# Patient Record
Sex: FEMALE | Race: WHITE | NOT HISPANIC OR LATINO | URBAN - METROPOLITAN AREA
[De-identification: names, ages, dates, MRNs, and addresses within clinical notes are randomized per-mention and may not be internally consistent; named-entity substitution may affect disease eponyms.]

---

## 2023-11-07 PROBLEM — G23.1 PSP (PROGRESSIVE SUPRANUCLEAR PALSY) (MULTI): Status: ACTIVE | Noted: 2023-11-07

## 2023-11-07 RX ORDER — METOPROLOL SUCCINATE 100 MG/1
TABLET, EXTENDED RELEASE ORAL
COMMUNITY
Start: 2021-06-04

## 2023-11-07 RX ORDER — GABAPENTIN 100 MG/1
1 CAPSULE ORAL NIGHTLY
COMMUNITY

## 2023-11-07 RX ORDER — CARBIDOPA AND LEVODOPA 25; 100 MG/1; MG/1
3 TABLET ORAL 2 TIMES DAILY
COMMUNITY
Start: 2022-01-26 | End: 2024-04-25

## 2023-11-07 RX ORDER — VALSARTAN AND HYDROCHLOROTHIAZIDE 320; 25 MG/1; MG/1
TABLET, FILM COATED ORAL
COMMUNITY
Start: 2022-03-08

## 2023-11-07 RX ORDER — SERTRALINE HYDROCHLORIDE 100 MG/1
1.5 TABLET, FILM COATED ORAL DAILY
COMMUNITY
Start: 2022-01-26

## 2023-11-08 ENCOUNTER — OFFICE VISIT (OUTPATIENT)
Dept: NEUROLOGY | Facility: CLINIC | Age: 63
End: 2023-11-08
Payer: COMMERCIAL

## 2023-11-08 VITALS — HEART RATE: 74 BPM | SYSTOLIC BLOOD PRESSURE: 120 MMHG | RESPIRATION RATE: 14 BRPM | DIASTOLIC BLOOD PRESSURE: 72 MMHG

## 2023-11-08 DIAGNOSIS — G23.1 PSP (PROGRESSIVE SUPRANUCLEAR PALSY) (MULTI): Primary | ICD-10-CM

## 2023-11-08 PROCEDURE — 99214 OFFICE O/P EST MOD 30 MIN: CPT | Performed by: PSYCHIATRY & NEUROLOGY

## 2023-11-08 PROCEDURE — 1036F TOBACCO NON-USER: CPT | Performed by: PSYCHIATRY & NEUROLOGY

## 2023-11-08 RX ORDER — AMANTADINE HYDROCHLORIDE 100 MG/1
100 TABLET ORAL DAILY
Qty: 60 TABLET | Refills: 3 | Status: SHIPPED | OUTPATIENT
Start: 2023-11-08 | End: 2023-11-24 | Stop reason: SDUPTHER

## 2023-11-08 ASSESSMENT — UNIFIED PARKINSONS DISEASE RATING SCALE (UPDRS)
SPEECH: 4
LEVODOPA: YES
FREEZING_GAIT: 0
TOETAPPING_RIGHT: 3
GAIT: 0
AMPLITUDE_RLE: 0
POSTURE: 2
AMPLITUDE_LUE: 0
RIGIDITY_RUE: 1
LEG_AGILITY_LEFT: 3
CONSTANCY_TREMOR_ATREST: 0
PRONATION_SUPINATION_RIGHT: 2
TOETAPPING_LEFT: 3
LEG_AGILITY_RIGHT: 3
CLINICAL_STATE: OFF
RIGIDITY_LLE: 0
POSTURAL_TREMOR_LEFTHAND: 0
FINGER_TAPPING_LEFT: 3
KINETIC_TREMOR_LEFTHAND: 0
POSTURAL_TREMOR_RIGHTHAND: 0
AMPLITUDE_RUE: 0
AMPLITUDE_LIP_JAW: 0
FINGER_TAPPING_RIGHT: 2
SPONTANEITY_OF_MOVEMENT: 2
AMPLITUDE_LLE: 0
POSTURAL_STABILITY: 0
RIGIDITY_NECK: 2
PARKINSONS_MEDS: YES
TOTAL_SCORE: 43
PRONATION_SUPINATION_LEFT: 3
HANDMOVEMENTS_RIGHT: 2
RIGIDITY_LUE: 2
CHAIR_RISING_SCALE: 0
FACIAL_EXPRESSION: 3
RIGIDITY_RLE: 0
KINETIC_TREMOR_RIGHTHAND: 0

## 2023-11-08 ASSESSMENT — PATIENT HEALTH QUESTIONNAIRE - PHQ9
SUM OF ALL RESPONSES TO PHQ9 QUESTIONS 1 AND 2: 0
2. FEELING DOWN, DEPRESSED OR HOPELESS: NOT AT ALL
1. LITTLE INTEREST OR PLEASURE IN DOING THINGS: NOT AT ALL

## 2023-11-08 ASSESSMENT — ENCOUNTER SYMPTOMS
DEPRESSION: 0
OCCASIONAL FEELINGS OF UNSTEADINESS: 0
LOSS OF SENSATION IN FEET: 0

## 2023-11-08 NOTE — PROGRESS NOTES
History Of Present Illness    Ms. Powell is a 63 year old female presenting for follow-up for PSP.  Her neurologic exam has showed slowing of vertical>horizontal eye movements, early falls, progressive parkinsonism poorly levodopa responsive, and positive applause sign with grasping behaviors.  She has been tried on levodopa up to 1000 mg daily without improvement in symptoms.  She experienced fatigue and GI symptoms at this dose.     Interval events  Last seen in March 2023.  Major issue at that time was falls related to rollator sliding out.  She discussed weaning off Sinemet completely however is still taking 3 tabs twice a day.    She is taking 3 tabs BID, cannot tell if they are helping (taking it at 10am, 7pm)  She is having headaches daily   Some swallowing issues per , she choked on pills once, she does not have any issues for solids or liquids   Her  has installed [id number] rails in their house that she holds onto to ambulate around the house  She is not using the walker as much   PT helped some but she was discharged from PT due to insurance issues   Sleep in fine , goes to bed 7-8pm, needs help in and out of bed.   Since taking gabapentin - pain in legs is better  She had a fall a few months ago and fell on her left arm, went to ED and XRs were negative for fracture. She is taking ibuprofen for pain.     Meds:  carbidopa-levodopa 3 tabs 2 times a day--denies benefit. Higher doses 5x/day were not helpful, felt there was zero benefit.   SE: denies       Review of Systems:  14 point review of systems conducted and negative other than noted in HPI.    Past Medical History  No past medical history on file.  Surgical History  No past surgical history on file.  Medications  (Not in a hospital admission)      Current Outpatient Medications:     carbidopa-levodopa (Sinemet)  mg tablet, Take 3 tablets by mouth 4 times a day., Disp: , Rfl:     gabapentin (Neurontin) 100 mg capsule, Take 1  tablet by mouth once daily at bedtime., Disp: , Rfl:     metoprolol succinate XL (Toprol-XL) 100 mg 24 hr tablet, Metoprolol Succinate  MG Oral Tablet Extended Release 24 Hour  Quantity: 30  Refills: 0      Start : 4-Jun-2021  Active, Disp: , Rfl:     sertraline (Zoloft) 100 mg tablet, Take 1.5 tablets (150 mg) by mouth once daily., Disp: , Rfl:     valsartan-hydrochlorothiazide (Diovan-HCT) 320-25 mg tablet, Valsartan-hydroCHLOROthiazide 320-25 MG Oral Tablet  Quantity: 30  Refills: 0      Start : 8-Mar-2022  Active, Disp: , Rfl:      Social History  Social History     Tobacco Use    Smoking status: Never    Smokeless tobacco: Never     Allergies  Mold, Other, and Pollen extracts    No MRI head results found for the past 12 months  No CT head results found for the past 12 months    Last Recorded Vitals  Blood pressure 120/72, pulse 74, resp. rate 14.    NEUROLOGIC EXAM:    MENTAL STATUS:  - Alert and oriented to self, year, situation and month.  - Recall of recent events intact. Fund of knowledge intact.  - Speech is is dysarthric    CRANIAL NERVES:  - CN I: Not Assessed  - CN II: Pupils constrict to light bilaterally 3->2 mm, visual fields intact bilaterally  - CN III, IV, VI: Severely restricted vertical gaze.  Impaired horizontal saccadic movements  - CN V: Facial sensation intact to light touch  - CN VII: No facial droop, closes eyes against resistance  - CN VIII: Hearing intact to voice  - CN IX, X: Palate elevates symmetrically  - CN XII: Tongue midline without atrophy or fasciculations    MOTOR:  - Strength: R                L  Shoulder Abd 5                5  Elbow Flex 5                5  Elbow Ext  5                5  Left hand dystonia    Hip flexion 5 -               5-  Knee Ext  5                5  Knee Flex        5                 5  DorsiFlex 5                5  PlantarFlex       5                5      COORDINATION: Finger-to-nose slow bilaterally  SENSATION: Intact and symmetric to light  touch in all extremities    MDS UPDRS 1st Score: Motor Examination  Is the patient on medication for treating the symptoms of Parkinson's Disease?: Yes  Patients receiving medication for treating the symptoms of Parkinson's Disease, delmis the patient's clinical state.: Off  Is the patient on Levodopa?: Yes  Speech: 4  Facial Expression: 3  Rigidty Neck: 2  Rigidty RUE: 1  Rigidity - LUE: 2  Rigidity RLE: 0  Rigidity LLE: 0  Finger Tapping Right Hand: 2  Finger Tapping Left Hand: 3  Hand Movements- Right Hand: 2  Hand Movements- Left Hand: 3  Pronatiaon-Supination Movments - Right Hand: 2  Pronatiaon-Supination Movments Left Hand: 3  Toe Tapping Right Foot: 3  Toe Tapping - Left Foot: 3  Leg Agility - Right Leg: 3  Leg Agility - Left leg: 3  Arising from Chair: 0 (unable to test)  Gait: 0 (unable to test)  Freezing of Gait: 0 (unable to test)  Postural Stability: 0 (unable to test)  Posture: 2  Global Spontanteity of Movment ( Body Bradykinesia): 2  Postural Tremor - Right Hand: 0  Postural Tremor - Left hand: 0  Kinetic Tremor - Right hand: 0  Kinetic Tremor - Left hand: 0  Rest Tremor Amplitude - RUE: 0  Rest Tremor Amplitude - LUE: 0  Rest Tremor Amplitude - RLE: 0  Rest Tremor Amplitude - LLE: 0  Rest Tremor Amplitude - Lip/Jaw: 0  Constancy of Rest Tremor: 0  MDS UPDRS Total Score: 43        Relevant Results  No results found for this or any previous visit (from the past 24 hour(s)).           Assessment    Ms. Powell is a 63 year old female presenting for follow-up for PSP.  Her neurologic exam has showed slowing of vertical>horizontal eye movements, early falls, progressive parkinsonism poorly levodopa responsive, and positive applause sign with grasping behaviors.  She has been tried on levodopa up to 1000 mg daily without improvement in symptoms.  She experienced fatigue and GI symptoms at this dose.  We discussed retrialing higher dose of Sinemet however she experienced side effects on the 1000 mg daily  without any benefit.  Anecdotally amantadine has been used with some improvement in gait and patient with PSP which patient and family would like to try.    Impression: Progressive supranuclear palsy    Recommendations  - Continue current Sinemet dosing 3 tabs twice daily (patient has hard time taking medications 3 times daily)  - Start amantadine 100 mg daily, after 1 week if tolerating increase to twice a day  -We will attempt for you stop approval  -Referral for home health physical therapy  -Return in 5 months, okay for virtual visit if preferred by patient    Mejia Rosas MD PhD Neurology Resident     Patient seen and discussed with Dr Jacob

## 2023-11-08 NOTE — PATIENT INSTRUCTIONS
Thank you for coming to see us today!    - take sinemet 3 tabs two times a day   - we will start a medication called amantadine , take 100mg daily for one week then increase to 100mg twice a day   - let us know if you have any side effects with the medication   - we will refer you for home physical therapy   - we will try to work on getting the U step walker   - return in 5 months - you can do a virtual visit if this is easier for you

## 2023-11-10 ENCOUNTER — TELEPHONE (OUTPATIENT)
Dept: NEUROLOGY | Facility: CLINIC | Age: 63
End: 2023-11-10
Payer: COMMERCIAL

## 2023-11-10 NOTE — TELEPHONE ENCOUNTER
Agustina called and stated that the patient lives outside the geographical area (zip code) in which they travel and they will not be able to provide care... She would need services from another area

## 2023-11-24 DIAGNOSIS — G23.1 PSP (PROGRESSIVE SUPRANUCLEAR PALSY) (MULTI): ICD-10-CM

## 2023-11-24 RX ORDER — AMANTADINE HYDROCHLORIDE 100 MG/1
100 TABLET ORAL 2 TIMES DAILY
Qty: 180 TABLET | Refills: 3 | Status: SHIPPED | OUTPATIENT
Start: 2023-11-24 | End: 2024-04-25

## 2023-11-24 NOTE — TELEPHONE ENCOUNTER
Patient Daughter called and stated instructions on Medication Amantadine needs to be sent into Pharmacy so that she can get refill... That have increased to the 2 a day now are low on meds.

## 2024-04-25 ENCOUNTER — TELEPHONE (OUTPATIENT)
Dept: NEUROLOGY | Facility: CLINIC | Age: 64
End: 2024-04-25
Payer: COMMERCIAL

## 2024-04-25 DIAGNOSIS — G23.1 PSP (PROGRESSIVE SUPRANUCLEAR PALSY) (MULTI): ICD-10-CM

## 2024-04-25 RX ORDER — CARBIDOPA AND LEVODOPA 25; 100 MG/1; MG/1
3 TABLET, ORALLY DISINTEGRATING ORAL 2 TIMES DAILY
Qty: 540 TABLET | Refills: 3 | Status: SHIPPED | OUTPATIENT
Start: 2024-04-25 | End: 2025-04-25

## 2024-04-25 RX ORDER — AMANTADINE HYDROCHLORIDE 50 MG/5ML
100 SOLUTION ORAL 2 TIMES DAILY
Qty: 1800 ML | Refills: 3 | Status: SHIPPED | OUTPATIENT
Start: 2024-04-25 | End: 2024-06-06

## 2024-04-25 NOTE — TELEPHONE ENCOUNTER
Daughter called stating patient is having a difficult time swallowing pills and would like to know can she have a liquid form of Carbidopa/Levodopa and Amantadine

## 2024-04-25 NOTE — TELEPHONE ENCOUNTER
I spoke to the pts  and he would like to try the new Rxs sent to their local [facility]. Will send to Dr hernandez.

## 2024-04-25 NOTE — TELEPHONE ENCOUNTER
I spoke to the pts daughter she confirms the patient current dose of C/L  is 3 tabs twice daily. Amantadine 100mg bid.

## 2024-06-06 ENCOUNTER — TELEPHONE (OUTPATIENT)
Dept: NEUROLOGY | Facility: CLINIC | Age: 64
End: 2024-06-06

## 2024-06-06 DIAGNOSIS — G23.1 PSP (PROGRESSIVE SUPRANUCLEAR PALSY) (MULTI): Primary | ICD-10-CM

## 2024-06-06 RX ORDER — AMANTADINE HYDROCHLORIDE 100 MG/1
100 TABLET ORAL 2 TIMES DAILY
Qty: 180 TABLET | Refills: 3 | Status: SHIPPED | OUTPATIENT
Start: 2024-06-06 | End: 2025-06-06

## 2024-07-24 ENCOUNTER — APPOINTMENT (OUTPATIENT)
Dept: NEUROLOGY | Facility: CLINIC | Age: 64
End: 2024-07-24
Payer: COMMERCIAL

## 2024-09-04 NOTE — TELEPHONE ENCOUNTER
Pharmacy just called and stated they just pulled script form on hold but do not have the option to get disintegrating tablet ...

## 2024-09-05 DIAGNOSIS — G23.1 PSP (PROGRESSIVE SUPRANUCLEAR PALSY) (MULTI): ICD-10-CM

## 2024-09-05 DIAGNOSIS — G20.A1 PARKINSON'S DISEASE, UNSPECIFIED WHETHER DYSKINESIA PRESENT, UNSPECIFIED WHETHER MANIFESTATIONS FLUCTUATE (MULTI): Primary | ICD-10-CM

## 2024-09-05 RX ORDER — CARBIDOPA AND LEVODOPA 25; 100 MG/1; MG/1
3 TABLET ORAL 2 TIMES DAILY
Qty: 540 TABLET | Refills: 2 | Status: SHIPPED | OUTPATIENT
Start: 2024-09-05 | End: 2025-09-05